# Patient Record
Sex: FEMALE | Race: BLACK OR AFRICAN AMERICAN
[De-identification: names, ages, dates, MRNs, and addresses within clinical notes are randomized per-mention and may not be internally consistent; named-entity substitution may affect disease eponyms.]

---

## 2019-07-09 ENCOUNTER — HOSPITAL ENCOUNTER (EMERGENCY)
Dept: HOSPITAL 1 - ED | Age: 29
LOS: 1 days | Discharge: LEFT BEFORE DISCHARGE | End: 2019-07-10
Payer: COMMERCIAL

## 2019-07-09 VITALS — WEIGHT: 254 LBS | HEIGHT: 68 IN | BODY MASS INDEX: 38.49 KG/M2

## 2019-07-09 DIAGNOSIS — Z90.49: ICD-10-CM

## 2019-07-09 DIAGNOSIS — Z98.890: ICD-10-CM

## 2019-07-09 DIAGNOSIS — T78.3XXA: Primary | ICD-10-CM

## 2019-07-09 LAB
ALBUMIN SERPL-MCNC: 3.4 G/DL (ref 3.4–5)
ALP SERPL-CCNC: 84 U/L (ref 46–116)
ALT SERPL-CCNC: 27 U/L (ref 14–59)
AST SERPL-CCNC: 11 U/L (ref 15–37)
BASOPHILS NFR BLD: 0.4 % (ref 0–2)
BILIRUB SERPL-MCNC: 0.1 MG/DL (ref 0.2–1)
BUN SERPL-MCNC: 15 MG/DL (ref 7–18)
CALCIUM SERPL-MCNC: 9.2 MG/DL (ref 8.5–10.1)
CHLORIDE SERPL-SCNC: 105 MMOL/L (ref 98–107)
CO2 SERPL-SCNC: 29.4 MMOL/L (ref 21–32)
CREAT SERPL-MCNC: 1 MG/DL (ref 0.6–1)
ERYTHROCYTE [DISTWIDTH] IN BLOOD BY AUTOMATED COUNT: 14 % (ref 11.5–14.5)
GFR SERPLBLD BASED ON 1.73 SQ M-ARVRAT: > 60 ML/MIN
GLUCOSE SERPL-MCNC: 101 MG/DL (ref 74–106)
PLATELET # BLD: 441 X10^3MCL (ref 130–400)
POTASSIUM SERPL-SCNC: 3.9 MMOL/L (ref 3.5–5.1)
PROT SERPL-MCNC: 7.2 G/DL (ref 6.4–8.2)
SODIUM SERPL-SCNC: 143 MMOL/L (ref 136–145)

## 2019-07-09 NOTE — NUR
PT. IN ED WITH C/O RASH TO BODY SINCE SUNDAY. REPORTS TONGUE SWELLING AND
FEELING SOB SINCE TODAY. STATES SHE HAS BEEN TAKING BACTRIM FOR ALMOST A WEEK
FOR A SINUS INFECTION. MULTIPLE SMALL FLUID FILLED BLISTERS ON ARMS AND LEGS.
NO HIVES NOTED. PT. REPROTS SHE HAS TAKEN BACTRIM IN THE PAST AND DID NOT HAVE
A REACTION. PT. AAOX4, TALKING AND RESPONDING APPROPRIATELY, BREATHING E/U.
SPEAKING IN FULL CLEAR SENTENCES. STATES HE THROAT FEELS DRY AND IS NOTED TO BE
CLEARING THROAT WHEN SHE SPEAKS. PLACED ON FULL CARDIAC MONITOR. WILL CONTINUE
TO MONITOR.

## 2019-07-09 NOTE — NUR
PT. MEDICATED PER E-MAR. REPORTS SHE DOES NOT WANT BENADRYL AT THIS TIME
BECAUSE SHE DOES NOT HAVE A RIDE HOME. DR. JIMÉNEZ MADE AWARE.

## 2019-07-10 VITALS — DIASTOLIC BLOOD PRESSURE: 66 MMHG | SYSTOLIC BLOOD PRESSURE: 130 MMHG

## 2019-07-10 NOTE — NUR
PT. SIGNED AMA. GIVEN D/C PAPERWORK AND RX. STATES SHE WILL GO TO [facility] NOW. PT. AAOX4, TALKING AND RESPONDING APPROPRIATELY, BREATHING E/U.
SPEAKING IN FULL CLEAR SENTENCES. NOT IN ANY APPARENT DISTRESS. AMBULATED OUT
OF ED WITH STEADY GAIT WITH .

## 2019-07-10 NOTE — NUR
PT. REFUSING TO STAY IN HOSPITAL AT THIS TIME. REPORTS SHE WILL GO TO ANOTHER
HOSPITAL THAT IS IN HER HER NETWORK. DR. JIMÉNEZ EXPLAINED RISK ASSOCIATED WITH
LEAVING AGAINST MEDICAL ADVICE AND RISK OF RASH AND SWELLING GETTING WORSE. PT.
VERBALIZED UNDERSTANDING. FAMILY MEMBER AT BEDSIDE AND IN AGREES WITH PATIENT
TO GO TO ANOTHER HOSPITAL.

## 2019-07-10 NOTE — NUR
PT. LAYING ON GURNEY IN POSITON OF COMFORT. BREATHING E/U. SPEAKING IN FULL
CLEAR SENTENCES. REPORTS IRRITATION/SWELLING IN THROAT HAS GONE AWAY. NO
DECREASE IN SIZE OF RASH/PUSTULES ON ARMS. AWAITING AMA PAPER WORK. FAMILY AT
BEDSIDE. WILL CONTINUE TO MONITOR.

## 2019-07-10 NOTE — NUR
RESIDENT AT BEDSIDE. PT. REPORTS [facility] IS NOT IN HER NETWORK AND SHE DOES NOT
WANT TO STAY IN HOSPITAL BECAUSE SHE IS WORRIED SHE WILL GET A BILL. DR. JIMÉNEZ
AT BEDSIDE TO DISCUSS PLAN OF CARE WITH PATIENT.